# Patient Record
Sex: MALE
[De-identification: names, ages, dates, MRNs, and addresses within clinical notes are randomized per-mention and may not be internally consistent; named-entity substitution may affect disease eponyms.]

---

## 2020-11-06 ENCOUNTER — HOSPITAL ENCOUNTER (OUTPATIENT)
Dept: HOSPITAL 47 - RADUSWWP | Age: 35
Discharge: HOME | End: 2020-11-06
Attending: INTERNAL MEDICINE
Payer: COMMERCIAL

## 2020-11-06 DIAGNOSIS — R94.5: Primary | ICD-10-CM

## 2020-11-06 PROCEDURE — 76705 ECHO EXAM OF ABDOMEN: CPT

## 2020-11-06 NOTE — US
EXAMINATION TYPE: US liver

 

DATE OF EXAM: 11/6/2020

 

COMPARISON: NONE

 

CLINICAL HISTORY: R94.5 ABN LIVER FUNCTIONS.

 

EXAM MEASUREMENTS:

 

Liver Length:  17.6 cm   

Gallbladder Wall:  .2 cm   

CBD:  .4 cm

Right Kidney:  9.7 x 4.7 x 4.7  cm

 

 

 

Pancreas:  Tail obscured by overlying bowel gas

Liver:Increased attentuation.  Hypoechoic area seen alex hepatis area 1.6 x 3.6 x  3.6 cm. 

Gallbladder:  wnl

**Evidence for sonographic Lora's sign:  No

CBD:  wnl 

Right Kidney:  wnl 

 

IMPRESSION:  

1. Focal ill-defined area within the liver. Consider CT abdomen with contrast for additional evaluati
on.